# Patient Record
Sex: MALE | Race: WHITE | Employment: FULL TIME | URBAN - METROPOLITAN AREA
[De-identification: names, ages, dates, MRNs, and addresses within clinical notes are randomized per-mention and may not be internally consistent; named-entity substitution may affect disease eponyms.]

---

## 2023-09-27 ENCOUNTER — HOSPITAL ENCOUNTER (EMERGENCY)
Facility: HOSPITAL | Age: 38
Discharge: HOME | End: 2023-09-27
Attending: EMERGENCY MEDICINE
Payer: COMMERCIAL

## 2023-09-27 ENCOUNTER — APPOINTMENT (OUTPATIENT)
Dept: GENERAL RADIOLOGY | Facility: HOSPITAL | Age: 38
End: 2023-09-27
Payer: COMMERCIAL

## 2023-09-27 VITALS
RESPIRATION RATE: 18 BRPM | SYSTOLIC BLOOD PRESSURE: 125 MMHG | DIASTOLIC BLOOD PRESSURE: 83 MMHG | HEART RATE: 68 BPM | TEMPERATURE: 98 F | OXYGEN SATURATION: 98 % | WEIGHT: 172 LBS

## 2023-09-27 DIAGNOSIS — S46.812A TRAPEZIUS STRAIN, LEFT, INITIAL ENCOUNTER: ICD-10-CM

## 2023-09-27 DIAGNOSIS — S16.1XXA STRAIN OF NECK MUSCLE, INITIAL ENCOUNTER: Primary | ICD-10-CM

## 2023-09-27 PROCEDURE — 99283 EMERGENCY DEPT VISIT LOW MDM: CPT

## 2023-09-27 PROCEDURE — 72050 X-RAY EXAM NECK SPINE 4/5VWS: CPT | Performed by: EMERGENCY MEDICINE

## 2023-09-27 PROCEDURE — 99284 EMERGENCY DEPT VISIT MOD MDM: CPT

## 2023-09-27 RX ORDER — CYCLOBENZAPRINE HCL 10 MG
10 TABLET ORAL 3 TIMES DAILY PRN
Qty: 20 TABLET | Refills: 0 | Status: SHIPPED | OUTPATIENT
Start: 2023-09-27 | End: 2023-10-04

## 2023-09-28 NOTE — DISCHARGE INSTRUCTIONS
Take 1000 mg acetaminophen (2 Tylenol tablets) and/or 600 mg ibuprofen (3 Advil tablets) every 6 hours as needed for pain or fever.     Apply heat to the painful area

## 2023-09-28 NOTE — ED INITIAL ASSESSMENT (HPI)
37YM c/c of neck injury Pt state that he was at work and moved his neck and now pain radiating down his L sided of his neck to his arm Pt state that he has slight tinging feeling in his neck.  Pt state that the pain is worse with movement

## 2024-05-24 ENCOUNTER — OFFICE VISIT (OUTPATIENT)
Dept: OCCUPATIONAL MEDICINE | Age: 39
End: 2024-05-24
Attending: FAMILY MEDICINE

## 2025-03-01 ENCOUNTER — HOSPITAL ENCOUNTER (OUTPATIENT)
Age: 40
Discharge: HOME | End: 2025-03-01
Payer: COMMERCIAL

## 2025-03-01 ENCOUNTER — APPOINTMENT (OUTPATIENT)
Dept: GENERAL RADIOLOGY | Age: 40
End: 2025-03-01
Attending: PHYSICIAN ASSISTANT
Payer: COMMERCIAL

## 2025-03-01 VITALS
RESPIRATION RATE: 20 BRPM | WEIGHT: 185 LBS | HEART RATE: 82 BPM | TEMPERATURE: 98 F | OXYGEN SATURATION: 99 % | HEIGHT: 67.5 IN | SYSTOLIC BLOOD PRESSURE: 146 MMHG | BODY MASS INDEX: 28.7 KG/M2 | DIASTOLIC BLOOD PRESSURE: 79 MMHG

## 2025-03-01 DIAGNOSIS — G89.29 CHRONIC LEFT-SIDED LOW BACK PAIN WITH LEFT-SIDED SCIATICA: Primary | ICD-10-CM

## 2025-03-01 DIAGNOSIS — M54.42 CHRONIC LEFT-SIDED LOW BACK PAIN WITH LEFT-SIDED SCIATICA: Primary | ICD-10-CM

## 2025-03-01 DIAGNOSIS — M62.830 SPASM OF MUSCLE OF LOWER BACK: ICD-10-CM

## 2025-03-01 PROCEDURE — 96372 THER/PROPH/DIAG INJ SC/IM: CPT | Performed by: PHYSICIAN ASSISTANT

## 2025-03-01 PROCEDURE — 99203 OFFICE O/P NEW LOW 30 MIN: CPT | Performed by: PHYSICIAN ASSISTANT

## 2025-03-01 PROCEDURE — 72110 X-RAY EXAM L-2 SPINE 4/>VWS: CPT | Performed by: PHYSICIAN ASSISTANT

## 2025-03-01 RX ORDER — KETOROLAC TROMETHAMINE 30 MG/ML
30 INJECTION, SOLUTION INTRAMUSCULAR; INTRAVENOUS ONCE
Status: COMPLETED | OUTPATIENT
Start: 2025-03-01 | End: 2025-03-01

## 2025-03-01 RX ORDER — DEXAMETHASONE 4 MG/1
4 TABLET ORAL ONCE
Status: COMPLETED | OUTPATIENT
Start: 2025-03-01 | End: 2025-03-01

## 2025-03-01 RX ORDER — METHYLPREDNISOLONE 4 MG/1
TABLET ORAL
Qty: 1 EACH | Refills: 0 | Status: SHIPPED | OUTPATIENT
Start: 2025-03-01

## 2025-03-01 RX ORDER — CYCLOBENZAPRINE HCL 10 MG
10 TABLET ORAL NIGHTLY
Qty: 20 TABLET | Refills: 0 | Status: SHIPPED | OUTPATIENT
Start: 2025-03-01

## 2025-03-01 NOTE — ED INITIAL ASSESSMENT (HPI)
Pt c/o 2 months of left sided back pain radiating down his left leg.  Pt states pain meds not working. Pt denies numbness and tingling.

## 2025-03-01 NOTE — DISCHARGE INSTRUCTIONS
Please return to the ER/clinic if symptoms worsen. Follow-up with your PCP in 24-48 hours as needed.    The decadron will work in your system the next several days.  May start the additional prednisone on day 2 or 3 if symptoms persist.  Recommend naproxen twice daily with food.  You may take the muscle relaxer before bed but allow 8 hours for operating machinery.  Recommend follow-up with orthospine and/or chiropractor for further evaluation and treatment.          [facility]  [address], [place] [address]  Hours:   Closed ? Opens 7:30?AM Mon  Phone: [phone]

## 2025-03-01 NOTE — ED PROVIDER NOTES
Patient Seen in: [facility]      History     Chief Complaint   Patient presents with    Back Pain     Back pain for 2 months getting worse have tried ibuprofen, soaking in epsom salt bath, heating pad and massage and nothing has helped it is effecting my work now. - Entered by patient     Stated Complaint: Back Pain - Back pain for 2 months getting worse have tried ibuprofen, soaking *    Subjective:   HPI      39-year-old male here with complaint of left-sided back pain radiating down his left leg for the past 2 months.  Patient has tried over-the-counter medications with no relief.  Patient denies chest pain, shortness of breath, cough, abdominal pain, nausea, vomiting or diarrhea.  Patient denies dysuria, hematuria or flank pain.  Patient able to ambulate.  Patient denies muscle weakness or neurodeficits.  Patient denies lack of bladder or bowel control.  Patient cannot recollect any specific injury trauma that preceded this.  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  Patient able to ambulate.  Afebrile.    Objective:     History reviewed. No pertinent past medical history.           History reviewed. No pertinent surgical history.           The patient's medication list, past medical history and social history elements  as listed in today's nurse's notes are reviewed and agree.   The patient's family history is reviewed and is noncontributory to the presenting problem, except as indicated as above.     Social History     Socioeconomic History    Marital status: Single   Tobacco Use    Smoking status: Former     Types: Cigarettes    Smokeless tobacco: Never   Vaping Use    Vaping status: Former   Substance and Sexual Activity    Alcohol use: Yes    Drug use: Not Currently     Social Drivers of Health     Food Insecurity: Low Risk  (5/5/2023)    [facility]    Food Insecurity     Have there been times  that your food ran out, and you didn't have money to get more?: No     Are there times that you worry that this might happen?: No   Transportation Needs: Low Risk  (5/5/2023)    [facility]    Transportation Needs     Do you have trouble getting transportation to medical appointments?: No              Review of Systems    Positive for stated complaint: Back Pain - Back pain for 2 months getting worse have tried ibuprofen, soaking *  Other systems are as noted in HPI.  Constitutional and vital signs reviewed.      All other systems reviewed and negative except as noted above.    Physical Exam     ED Triage Vitals [03/01/25 1407]   /79   Pulse 82   Resp 20   Temp 98 °F (36.7 °C)   Temp src Oral   SpO2 99 %   O2 Device None (Room air)       Current Vitals:   Vital Signs  BP: 146/79  Pulse: 82  Resp: 20  Temp: 98 °F (36.7 °C)  Temp src: Oral    Oxygen Therapy  SpO2: 99 %  O2 Device: None (Room air)        Physical Exam  Vitals and nursing note reviewed.   Constitutional:       Appearance: Normal appearance. He is well-developed.   HENT:      Head: Normocephalic.      Right Ear: External ear normal.      Left Ear: External ear normal.      Nose: Nose normal.      Mouth/Throat:      Mouth: Mucous membranes are moist.   Eyes:      Conjunctiva/sclera: Conjunctivae normal.      Pupils: Pupils are equal, round, and reactive to light.   Cardiovascular:      Rate and Rhythm: Normal rate and regular rhythm.      Heart sounds: Normal heart sounds.   Pulmonary:      Effort: Pulmonary effort is normal.      Breath sounds: Normal breath sounds.   Musculoskeletal:      Cervical back: Normal range of motion and neck supple.      Lumbar back: Spasms, tenderness and bony tenderness present. Decreased range of motion.        Back:       Comments: LB: no erythema/warmth/fluctuance:  LLE/RLE: FROM< N/V intact, strength 5/5   Skin:     General: Skin is warm.       Capillary Refill: Capillary refill takes less than 2 seconds.   Neurological:      General: No focal deficit present.      Mental Status: He is alert and oriented to person, place, and time.   Psychiatric:         Mood and Affect: Mood normal.         Behavior: Behavior normal.         Thought Content: Thought content normal.         Judgment: Judgment normal.           ED Course   I personally reviewed the xray images and and saw these findings: minimal DJD/spasm  XR LUMBAR SPINE (MIN 4 VIEWS) (CPT=72110)    Result Date: 3/1/2025  PROCEDURE:  XR LUMBAR SPINE (MIN 4 VIEWS) (CPT=72110)  TECHNIQUE:  AP, lateral, oblique, and coned down L5-S1 views were obtained.  COMPARISON:  None.  INDICATIONS:  Back Pain - Back pain for 2 months getting worse have tried ibuprofen, soaking in epsom salt bath, heating pad and massage and nothing has helped it is effectin  PATIENT STATED HISTORY: (As transcribed by Technologist)  Patient states lower left back pain with pain radiating down his left leg to his left knee. Pain has been going on for 2 months but he states he has had on and off back pain for the past couple years.              CONCLUSION:    Straightening of the lumbar spine with loss of lordosis, most likely reflecting chronic lumbar strain.  No fracture or subluxation identified.  No pars defect seen.  No destructive lytic lesions of the bone.  No disc narrowing.  Minimal degenerative disc changes.    LOCATION:  [facility]   Dictated by (CST): Miguel A Valentino MD on 3/01/2025 at 2:50 PM     Finalized by (CST): Miguel A Valentino MD on 3/01/2025 at 2:51 PM                   MDM       Clinical Impression: LBP L-sided with radiculopathy/back spasm  Course of Treatment:   The decadron will work in your system the next several days.  May start the additional prednisone on day 2 or 3 if symptoms persist.  Recommend naproxen twice daily with food.  You may take the muscle relaxer before bed but allow 8 hours for operating machinery.   Recommend follow-up with ortho spine and/or chiropractor for further evaluation and treatment.    The patient is encouraged to return if any concerning symptoms arise. Additional verbal discharge instructions are given and discussed. Discharge medications are discussed. The patient is in good condition throughout the visit today and remains so upon discharge. I discuss the plan of care with the patient, who expresses understanding. All questions and concerns are addressed to the patient's satisfaction prior to discharge today.  Previous conversations with PCP and charts were reviewed.              Disposition and Plan     Clinical Impression:  1. Chronic left-sided low back pain with left-sided sciatica    2. Spasm of muscle of lower back         Disposition:  Discharge  3/1/2025  3:23 pm    Follow-up:  Regina Dong MD  [address] [address]  [phone]          Carlos Hinojosa PA  [address] [place] [id number]  [phone]                Medications Prescribed:  Current Discharge Medication List        START taking these medications    Details   methylPREDNISolone (MEDROL) 4 MG Oral Tablet Therapy Pack Dosepack: take as directed  Qty: 1 each, Refills: 0      cyclobenzaprine 10 MG Oral Tab Take 1 tablet (10 mg total) by mouth nightly.  Qty: 20 tablet, Refills: 0                 Supplementary Documentation:

## (undated) NOTE — LETTER
Date & Time: 9/27/2023, 9:48 PM  Patient: Carole Larios  Encounter Provider(s):    Jourdan Farmer MD       To Whom It May Concern:    Chanda Blackmon was seen and treated in our department on 9/27/2023. He should not return to work until 10/2/23 .     If you have any questions or concerns, please do not hesitate to call.        _____________________________  Physician/APC Signature